# Patient Record
Sex: MALE | Race: WHITE
[De-identification: names, ages, dates, MRNs, and addresses within clinical notes are randomized per-mention and may not be internally consistent; named-entity substitution may affect disease eponyms.]

---

## 2020-06-26 ENCOUNTER — HOSPITAL ENCOUNTER (OUTPATIENT)
Dept: HOSPITAL 57 - BURRAD | Age: 70
Discharge: HOME | End: 2020-06-26
Attending: FAMILY MEDICINE
Payer: MEDICARE

## 2020-06-26 DIAGNOSIS — M79.671: Primary | ICD-10-CM

## 2020-06-26 DIAGNOSIS — M77.31: ICD-10-CM

## 2020-06-26 NOTE — RAD
RIGHT FOOT THREE VIEWS:

6/26/20

 

There is a relatively small calcaneal spur present. Aside from this, the calcaneus otherwise appears 
normal. No soft tissue calcifications were seen. No fracture or periosteal reaction was seen in the r
emainder of the foot. The tarsal relationships seem normal. 

 

IMPRESSION: 

Small calcaneal spur.

 

Please fax results to [phone]. 

 

POS: HOME